# Patient Record
(demographics unavailable — no encounter records)

---

## 2025-01-23 NOTE — PHYSICAL EXAM
[Alert] : alert [No Acute Distress] : no acute distress [Normal Pupil & Iris Size/Symmetry] : normal pupil and iris size and symmetry [Normal Lips/Tongue] : the lips and tongue were normal [Normal Outer Ear/Nose] : the ears and nose were normal in appearance [Boggy Nasal Turbinates] : boggy and/or pale nasal turbinates [Normal Rate and Effort] : normal respiratory rhythm and effort [Normal Rate] : heart rate was normal  [Patches] : ~M patches present [Conjunctival Erythema] : no conjunctival erythema [Pale mucosa] : no pale mucosa [Pharyngeal erythema] : no pharyngeal erythema [Posterior Pharyngeal Cobblestoning] : no posterior pharyngeal cobblestoning [Clear Rhinorrhea] : no clear rhinorrhea was seen [Wheezing] : no wheezing was heard [de-identified] : mild dry patches noted on face

## 2025-01-23 NOTE — ASSESSMENT
[FreeTextEntry1] : 11 yr old with known reaction to PN and ?? melons - OK with cantaloupe now avoiding PN, TN and watermelon and honeydew.  Hx of AR - on Flonase 1sp QD PRN, Zyrtec 10mg QD PRN and Zaditor BID PRN  Pt interested in watermelon and honeydew challenge  Immunocaps from 4/2024 was reviewed again with mother and patient.  ST done today - Fresh Honeydew and fresh Pinenut 10mm and Fresh Watermelon 4mm (Borderline)   Suggest - Consider Watermelon challenge in office - mom will make appointment, avoid until challenge is done - Continue to avoid PN, TN and Honeydew.  - Carry EpiPen for emergencies.   AR  - Continue to use Flonase 1sp QD PRN, Zyrtec 10mg QD PRN and Zaditor BID PRN - can use allergy medications daily in spring pollen season.   F/U in 1 yr or PRN   Total time spent 25 minutes on the date of the encounter. This included time devoted to preparing to see the patient with review of previous medical record, obtaining medical history, performing physical exam, counseling and patient education with patient and family, ordering medications and lab studies, documentation in the medical record and coordination of care. The time spent is separate from time spent on separately billed procedures.

## 2025-01-23 NOTE — HISTORY OF PRESENT ILLNESS
[Asthma] : asthma [Venom Reactions] : venom reactions [Drug Allergies] : drug allergies [de-identified] : 11 yr old last seen 04/02/2024 with known reaction to peanut and tree nut - avoids PN, TN and watermelon and honeydew due to positive ST in 2022 to fresh honeydew and watermelon.  Pt had a hx of known reaction to cantaloupe - now with tolerance to cantaloupe (passed cantaloupe challenge 09/2/2021) No further food reactions since last visit and pt does have an up-to-date EpiPen to use for emergencies.  Pt is interested in watermelon and honey dew challenge   Last ST 2021 and 2022 - honeydew 6mm, watermelon 10mm  Last Immunocaps 4/22/2024 1. PN - numbers up from 10 to 29 with pos Radha h2 - avoid (allergy no worse just persistent) 2. TN - all number up or the same - avoid Macadamia and pine nut however are low - if patient is at all interested in challenge we could arrange a fresh ST if interested 3. Melon - (represent a melon mixture ) - low at 0.78 Watermelon - low at 0.76 Suggest a fresh ST to honeydew and watermelon - mom brings from home and we can conisder chalenge if small  Hx of AR - year around but worse in spring pollen season.  Pt uses Zyrtec/Claritin 10 mg qd, Flonase 1s qd and Zaditor eye drops BID in spring pollen season and rest of the year he uses his allergy meds PRN with help.

## 2025-01-23 NOTE — SOCIAL HISTORY
[House] : [unfilled] lives in a house  [Central Forced Air] : heating provided by central forced air [Central] : air conditioning provided by central unit [Bedroom] :  in bedroom [None] : none [Dust Mite Covers] : does not have dust mite covers [Basement] : not in the basement [Living Area] : not in the living area [Smokers in Household] : there are no smokers in the home

## 2025-03-25 NOTE — HISTORY OF PRESENT ILLNESS
[Consent obtained and signed form scanned in to chart] : Consent obtained and signed form scanned in to chart [] : The following medications are to be available during the challenge procedure: [Diphenhydramine] : Diphenhydramine, 1-2mg/kg IM (max dose 50mg), (50mg/1 cc) [Solucortef] : Solucortef, 4-8 mg/kg IM (max dose 200 mg), (100mg/2 cc) [Epinephrine 1:1000 IM] : Epinephrine 1:1000 IM, 0.01cc/kg (max dose 0.5 cc) [Albuterol nebulized] : Albuterol nebulized, 0.083% [_______] : Time: [unfilled] [Clear] : Skin Findings: Clear

## 2025-03-25 NOTE — PHYSICAL EXAM
[Alert] : alert [No Acute Distress] : no acute distress [Normal Voice/Communication] : normal voice communication [Supple] : the neck was supple [Normal S1, S2] : normal S1 and S2 [Regular Rhythm] : with a regular rhythm [Normal Cervical Lymph Nodes] : cervical [Skin Intact] : skin intact  [No Rash] : no rash [No clubbing] : no clubbing [No Cyanosis] : no cyanosis [Alert, Awake, Oriented as Age-Appropriate] : alert, awake, oriented as age appropriate